# Patient Record
Sex: MALE | Race: BLACK OR AFRICAN AMERICAN | Employment: OTHER | URBAN - METROPOLITAN AREA
[De-identification: names, ages, dates, MRNs, and addresses within clinical notes are randomized per-mention and may not be internally consistent; named-entity substitution may affect disease eponyms.]

---

## 2021-04-01 ENCOUNTER — NURSING HOME VISIT (OUTPATIENT)
Dept: GERIATRICS | Facility: CLINIC | Age: 66
End: 2021-04-01
Payer: COMMERCIAL

## 2021-04-01 VITALS
BODY MASS INDEX: 34.65 KG/M2 | RESPIRATION RATE: 20 BRPM | SYSTOLIC BLOOD PRESSURE: 121 MMHG | HEART RATE: 78 BPM | OXYGEN SATURATION: 96 % | TEMPERATURE: 98 F | WEIGHT: 270 LBS | HEIGHT: 74 IN | DIASTOLIC BLOOD PRESSURE: 79 MMHG

## 2021-04-01 DIAGNOSIS — F33.3 SEVERE RECURRENT MAJOR DEPRESSIVE DISORDER WITH PSYCHOTIC FEATURES (H): ICD-10-CM

## 2021-04-01 DIAGNOSIS — B20 HUMAN IMMUNODEFICIENCY VIRUS (HIV) DISEASE (H): ICD-10-CM

## 2021-04-01 DIAGNOSIS — I10 ESSENTIAL HYPERTENSION: ICD-10-CM

## 2021-04-01 DIAGNOSIS — I50.42 CHRONIC COMBINED SYSTOLIC AND DIASTOLIC HEART FAILURE (H): ICD-10-CM

## 2021-04-01 DIAGNOSIS — F11.20 METHADONE DEPENDENCE (H): ICD-10-CM

## 2021-04-01 DIAGNOSIS — I48.20 CHRONIC ATRIAL FIBRILLATION (H): ICD-10-CM

## 2021-04-01 DIAGNOSIS — K21.9 GASTROESOPHAGEAL REFLUX DISEASE WITHOUT ESOPHAGITIS: ICD-10-CM

## 2021-04-01 DIAGNOSIS — R53.81 PHYSICAL DECONDITIONING: Primary | ICD-10-CM

## 2021-04-01 PROCEDURE — 99309 SBSQ NF CARE MODERATE MDM 30: CPT | Performed by: NURSE PRACTITIONER

## 2021-04-01 RX ORDER — MULTIVITAMIN
1 TABLET ORAL DAILY
COMMUNITY
Start: 2021-03-30 | End: 2021-04-02

## 2021-04-01 RX ORDER — NITROGLYCERIN 0.4 MG/1
0.4 TABLET SUBLINGUAL EVERY 5 MIN PRN
COMMUNITY
Start: 2021-03-30

## 2021-04-01 RX ORDER — IPRATROPIUM BROMIDE AND ALBUTEROL SULFATE 2.5; .5 MG/3ML; MG/3ML
3 SOLUTION RESPIRATORY (INHALATION) EVERY 4 HOURS PRN
COMMUNITY
Start: 2021-03-30

## 2021-04-01 RX ORDER — ALPRAZOLAM 2 MG
1 TABLET ORAL 2 TIMES DAILY PRN
COMMUNITY
End: 2021-04-06

## 2021-04-01 RX ORDER — TORSEMIDE 20 MG/1
40 TABLET ORAL DAILY
COMMUNITY
Start: 2021-03-30

## 2021-04-01 RX ORDER — NICOTINE 21 MG/24HR
1 PATCH, TRANSDERMAL 24 HOURS TRANSDERMAL DAILY
COMMUNITY
Start: 2021-03-30

## 2021-04-01 RX ORDER — POTASSIUM CHLORIDE 1500 MG/1
20 TABLET, EXTENDED RELEASE ORAL DAILY
COMMUNITY
Start: 2021-03-30

## 2021-04-01 RX ORDER — MOMETASONE FUROATE AND FORMOTEROL FUMARATE DIHYDRATE 100; 5 UG/1; UG/1
2 AEROSOL RESPIRATORY (INHALATION) 2 TIMES DAILY
COMMUNITY
Start: 2021-03-30

## 2021-04-01 RX ORDER — LACTULOSE 10 G/15ML
15 SOLUTION ORAL 2 TIMES DAILY PRN
COMMUNITY
Start: 2021-03-30

## 2021-04-01 RX ORDER — FLUTICASONE PROPIONATE 50 MCG
1 SPRAY, SUSPENSION (ML) NASAL DAILY
COMMUNITY

## 2021-04-01 RX ORDER — ACETAMINOPHEN 325 MG/1
650 TABLET ORAL EVERY 6 HOURS PRN
COMMUNITY
Start: 2020-05-04

## 2021-04-01 RX ORDER — DIPHENHYDRAMINE HCL 25 MG
25 CAPSULE ORAL EVERY 6 HOURS PRN
COMMUNITY
Start: 2021-03-30

## 2021-04-01 RX ORDER — METHADONE HYDROCHLORIDE 10 MG/ML
45 CONCENTRATE ORAL DAILY
COMMUNITY

## 2021-04-01 RX ORDER — DOLUTEGRAVIR SODIUM 50 MG/1
50 TABLET, FILM COATED ORAL DAILY
COMMUNITY
Start: 2021-03-30

## 2021-04-01 RX ORDER — DIGOXIN 125 MCG
125 TABLET ORAL DAILY
COMMUNITY

## 2021-04-01 RX ORDER — QUETIAPINE FUMARATE 100 MG/1
25 TABLET, FILM COATED ORAL AT BEDTIME
COMMUNITY

## 2021-04-01 RX ORDER — ALBUTEROL SULFATE 90 UG/1
2 AEROSOL, METERED RESPIRATORY (INHALATION) 4 TIMES DAILY
COMMUNITY

## 2021-04-01 RX ORDER — ESCITALOPRAM OXALATE 10 MG/1
10 TABLET ORAL EVERY MORNING
COMMUNITY
Start: 2021-03-30

## 2021-04-01 RX ORDER — AMLODIPINE BESYLATE 5 MG/1
5 TABLET ORAL DAILY
COMMUNITY
Start: 2021-03-30

## 2021-04-01 RX ORDER — DULOXETIN HYDROCHLORIDE 30 MG/1
30 CAPSULE, DELAYED RELEASE ORAL DAILY
COMMUNITY
Start: 2021-03-30

## 2021-04-01 RX ORDER — EMTRICITABINE AND TENOFOVIR ALAFENAMIDE 200; 25 MG/1; MG/1
1 TABLET ORAL DAILY
COMMUNITY
Start: 2021-03-30 | End: 2021-04-02

## 2021-04-01 RX ORDER — GABAPENTIN 600 MG/1
600 TABLET ORAL 3 TIMES DAILY
COMMUNITY
Start: 2021-03-30

## 2021-04-01 RX ORDER — METOPROLOL SUCCINATE 200 MG/1
200 TABLET, EXTENDED RELEASE ORAL DAILY
COMMUNITY
Start: 2021-03-30

## 2021-04-01 ASSESSMENT — MIFFLIN-ST. JEOR: SCORE: 2074.71

## 2021-04-01 NOTE — PROGRESS NOTES
[place] [facility]  PRIMARY CARE PROVIDER AND CLINIC:  [facility] [place], [address] [place] [id number]  Chief Complaint   Patient presents with     [facility] F/U     [place] Medical Record Number:  [id number]  Place of Service where encounter took place:  [facility] (TCU) [[id number]]    Juan Pena  is a 65 year old  (1955), admitted to the above facility from  [facility] . Hospital stay 3/25/21 through 3/31/21..  Admitted to this facility for  rehab, medical management and nursing care.    HPI:    HPI information obtained from: facility chart records, facility staff, patient report and Care Everywhere Epic chart review.   Brief Summary of Hospital Course: PMH HIV (followed by Rina COURTNEY), afib on Xarelto, HTN, CHF, cardiomyopathy, methadone dependence, and GERD. He presented to the ED with slurred speech, decreased LOC, and facial droop. This was ultimately determined to be cerebral hypoperfusion or sedating medication induced. Ammonia level noted to be elevated and he was started on lactulose. He later admitted to drinking prior to admission due to birth of grandson. EEG negative. He was recently admitted to [facility] 3/22-3/24 for acute CHF. Demadex was added that stay.    Updates on Status Since Skilled nursing Admission:   Nursing reports that patient is constantly drowsy, often slow to respond to questions. He continues to ask for his xanax. He answers some questions but then spends several minutes trying to remember something he needed. He eventually gets out that he needs more clothes. The nurse manager advises that they will ask family to bring some. He says he has no other concerns    CODE STATUS/ADVANCE DIRECTIVES DISCUSSION:   CPR/Full code   Patient's living condition: lives with spouse  ALLERGIES: Demeclocycline and Tetracyclines  PAST MEDICAL HISTORY:  has a past medical history of Anxiety, Depressive disorder, Diabetes mellitus  (H), Hepatitis C, HIV (human immunodeficiency virus infection) (H), Hypertension, and Myocardial infarction.  PAST SURGICAL HISTORY:   has a past surgical history that includes orthopedic surgery.  FAMILY HISTORY: family history is not on file.  SOCIAL HISTORY:       Post Discharge Medication Reconciliation Status: discharge medications reconciled, continue medications without change    Current Outpatient Medications   Medication Sig Dispense Refill     acetaminophen (TYLENOL) 325 MG tablet Take 650 mg by mouth every 6 hours as needed       albuterol (PROAIR HFA/PROVENTIL HFA/VENTOLIN HFA) 108 (90 Base) MCG/ACT inhaler Inhale 2 puffs into the lungs 4 times daily       ALPRAZolam (XANAX) 2 MG tablet Take 1 mg by mouth 2 times daily as needed       amLODIPine (NORVASC) 5 MG tablet Take 5 mg by mouth daily       digoxin (LANOXIN) 125 MCG tablet Take 125 mcg by mouth daily       diphenhydrAMINE (BENADRYL) 25 MG capsule Take 25 mg by mouth every 6 hours as needed       dolutegravir (TIVICAY) 50 MG tablet Take 50 mg by mouth daily Special Instructions: Must be administered 2 hours before or at least 6 hours after multivitamin.       DULoxetine (CYMBALTA) 30 MG capsule Take 30 mg by mouth daily       emtricitabine-tenofovir (TRUVADA) 200-300 MG per tablet Take 1 tablet by mouth daily.       escitalopram (LEXAPRO) 10 MG tablet Take 10 mg by mouth every morning       fluticasone (FLONASE) 50 MCG/ACT nasal spray Spray 1 spray into both nostrils daily       gabapentin (NEURONTIN) 600 MG tablet Take 600 mg by mouth 3 times daily       ipratropium - albuterol 0.5 mg/2.5 mg/3 mL (DUONEB) 0.5-2.5 (3) MG/3ML neb solution Inhale 3 mLs into the lungs every 4 hours as needed       lactulose (CHRONULAC) 10 GM/15ML solution Take 15 mLs by mouth 2 times daily as needed       lisinopril (PRINIVIL,ZESTRIL) 20 MG tablet Take 1 tablet by mouth daily.       melatonin 5 MG tablet Take 10 mg by mouth nightly as needed       methadone  "(DOLOPHINE-INTENSOL) 10 MG/ML (HIGH CONC) solution Take 45 mg by mouth daily Resident will go to Methadone clinic for Monday through Saturday doses. Facility will administer Sunday dose       metoprolol succinate ER (TOPROL-XL) 200 MG 24 hr tablet Take 200 mg by mouth daily       mometasone-formoterol (DULERA) 100-5 MCG/ACT inhaler Inhale 2 puffs into the lungs 2 times daily       Multiple Vitamins-Minerals (MULTIVITAL) TABS Take 1 tablet by mouth daily.       nicotine (NICODERM CQ) 21 MG/24HR 24 hr patch Place 1 patch onto the skin daily       nicotine (NICOTROL) 10 MG inhaler Inhale 10 mg into the lungs every 4 hours as needed       nitroGLYcerin (NITROSTAT) 0.4 MG sublingual tablet Place 0.4 mg under the tongue every 5 minutes as needed       potassium chloride ER (KLOR-CON M) 20 MEQ CR tablet Take 20 mEq by mouth daily       QUEtiapine (SEROQUEL) 100 MG tablet Take 25 mg by mouth At Bedtime        rivaroxaban ANTICOAGULANT (XARELTO) 20 MG TABS tablet Take 20 mg by mouth daily (with dinner)       torsemide (DEMADEX) 20 MG tablet Take 40 mg by mouth daily         ROS:  Limited secondary to drowsiness but today pt reports 4 point ROS including Respiratory, CV, GI and , other than that noted in the HPI,  is negative    Vitals:  /79   Pulse 78   Temp 98  F (36.7  C)   Resp 20   Ht 1.88 m (6' 2.02\")   Wt 122.5 kg (270 lb)   SpO2 96%   BMI 34.65 kg/m    Exam:  GENERAL APPEARANCE:  Drowsy, briefly opens eyes, then closes them again, clothes wet from spilled water  ENT:  Mouth and posterior oropharynx normal, moist mucous membranes, normal hearing acuity  EYES:  Conjunctiva and lids normal  RESP:  respiratory effort and palpation of chest normal, lungs clear to auscultation , no respiratory distress  CV:  Palpation and auscultation of heart done , regular rate and rhythm, no murmur, rub, or gallop, peripheral edema 1+ in ankles  ABDOMEN:  bowel sounds normal, soft, non-tender  SKIN:  Inspection of skin and " subcutaneous tissue baseline, Palpation of skin and subcutaneous tissue baseline  PSYCH:  insight and judgement impaired, challenging to get much conversation from him    Lab/Diagnostic data:  Recent labs in EPIC reviewed by me today.       ASSESSMENT/PLAN:  (R53.81) Physical deconditioning  (primary encounter diagnosis)  Comment: Due to hospital stays, illness. Plan is to discharge home when therapy goals met  Plan: PT/OT eval and treat, discharge planning per their recommendations.    (I10) Essential hypertension  Comment: Chronic, controlled  Plan: Continue current POC with no changes at this time and adjustments as needed.    (F33.3) Severe recurrent major depressive disorder with psychotic features (H)  Comment: Patient is quite drowsy. He does not appear encephalopathic, just sedated. He has not had access to any alcohol, so this is likely related to his medications.   Plan: Continue current POC with no changes at this time and adjustments as needed.    (I48.20) Chronic atrial fibrillation (H)  Comment: HR controlled. No s/sx side effects with DOAC  Plan: Continue current POC with no changes at this time and adjustments as needed.    (F11.20) Methadone dependence (H)  Comment: Typically goes to methadone clinic daily, this can be administered here.  Plan: Continue current POC with no changes at this time and adjustments as needed.    (I50.42) Chronic combined systolic and diastolic heart failure (H)  Comment: Mild edema at ankles, otherwise no evidence of fluid overload.   Plan: Continue current torsemide. Monitor for shortness of breath, wheezing, increasing lower extremity edema and change in activity tolerance.     (K21.9) Gastroesophageal reflux disease without esophagitis  Comment: No obvious symptoms  Plan: Continue current POC with no changes at this time and adjustments as needed.    (B20) Human immunodeficiency virus (HIV) disease (H)  Comment: Chronic, managed by ID. No acute  concerns      Electronically signed by:  PERLA Peacock [facility]  Phone: [phone]

## 2021-04-01 NOTE — LETTER
4/1/2021        RE: Juan Pena  [address]  [facility] [address]        [place] [facility]  PRIMARY CARE PROVIDER AND CLINIC:  [facility], [address] [facility] [address]  Chief Complaint   Patient presents with     [facility] F/U     [place] Medical Record Number:  [id number]  Place of Service where encounter took place:  [facility] (TCU) [[id number]]    Juan Pena  is a 65 year old  (1955), admitted to the above facility from  [facility] . Hospital stay 3/25/21 through 3/31/21..  Admitted to this facility for  rehab, medical management and nursing care.    HPI:    HPI information obtained from: facility chart records, facility staff, patient report and Care Everywhere Epic chart review.   Brief Summary of Hospital Course: PMH HIV (followed by Rina COURTNEY), afib on Xarelto, HTN, CHF, cardiomyopathy, methadone dependence, and GERD. He presented to the ED with slurred speech, decreased LOC, and facial droop. This was ultimately determined to be cerebral hypoperfusion or sedating medication induced. Ammonia level noted to be elevated and he was started on lactulose. He later admitted to drinking prior to admission due to birth of grandson. EEG negative. He was recently admitted to [facility] 3/22-3/24 for acute CHF. Demadex was added that stay.    Updates on Status Since Skilled nursing Admission:   Nursing reports that patient is constantly drowsy, often slow to respond to questions. He continues to ask for his xanax. He answers some questions but then spends several minutes trying to remember something he needed. He eventually gets out that he needs more clothes. The nurse manager advises that they will ask family to bring some. He says he has no other concerns    CODE STATUS/ADVANCE DIRECTIVES DISCUSSION:   CPR/Full code   Patient's living condition: lives with spouse  ALLERGIES: Demeclocycline and Tetracyclines  PAST  MEDICAL HISTORY:  has a past medical history of Anxiety, Depressive disorder, Diabetes mellitus (H), Hepatitis C, HIV (human immunodeficiency virus infection) (H), Hypertension, and Myocardial infarction.  PAST SURGICAL HISTORY:   has a past surgical history that includes orthopedic surgery.  FAMILY HISTORY: family history is not on file.  SOCIAL HISTORY:       Post Discharge Medication Reconciliation Status: discharge medications reconciled, continue medications without change    Current Outpatient Medications   Medication Sig Dispense Refill     acetaminophen (TYLENOL) 325 MG tablet Take 650 mg by mouth every 6 hours as needed       albuterol (PROAIR HFA/PROVENTIL HFA/VENTOLIN HFA) 108 (90 Base) MCG/ACT inhaler Inhale 2 puffs into the lungs 4 times daily       ALPRAZolam (XANAX) 2 MG tablet Take 1 mg by mouth 2 times daily as needed       amLODIPine (NORVASC) 5 MG tablet Take 5 mg by mouth daily       digoxin (LANOXIN) 125 MCG tablet Take 125 mcg by mouth daily       diphenhydrAMINE (BENADRYL) 25 MG capsule Take 25 mg by mouth every 6 hours as needed       dolutegravir (TIVICAY) 50 MG tablet Take 50 mg by mouth daily Special Instructions: Must be administered 2 hours before or at least 6 hours after multivitamin.       DULoxetine (CYMBALTA) 30 MG capsule Take 30 mg by mouth daily       emtricitabine-tenofovir (TRUVADA) 200-300 MG per tablet Take 1 tablet by mouth daily.       escitalopram (LEXAPRO) 10 MG tablet Take 10 mg by mouth every morning       fluticasone (FLONASE) 50 MCG/ACT nasal spray Spray 1 spray into both nostrils daily       gabapentin (NEURONTIN) 600 MG tablet Take 600 mg by mouth 3 times daily       ipratropium - albuterol 0.5 mg/2.5 mg/3 mL (DUONEB) 0.5-2.5 (3) MG/3ML neb solution Inhale 3 mLs into the lungs every 4 hours as needed       lactulose (CHRONULAC) 10 GM/15ML solution Take 15 mLs by mouth 2 times daily as needed       lisinopril (PRINIVIL,ZESTRIL) 20 MG tablet Take 1 tablet by mouth  "daily.       melatonin 5 MG tablet Take 10 mg by mouth nightly as needed       methadone (DOLOPHINE-INTENSOL) 10 MG/ML (HIGH CONC) solution Take 45 mg by mouth daily Resident will go to Methadone clinic for Monday through Saturday doses. Facility will administer Sunday dose       metoprolol succinate ER (TOPROL-XL) 200 MG 24 hr tablet Take 200 mg by mouth daily       mometasone-formoterol (DULERA) 100-5 MCG/ACT inhaler Inhale 2 puffs into the lungs 2 times daily       Multiple Vitamins-Minerals (MULTIVITAL) TABS Take 1 tablet by mouth daily.       nicotine (NICODERM CQ) 21 MG/24HR 24 hr patch Place 1 patch onto the skin daily       nicotine (NICOTROL) 10 MG inhaler Inhale 10 mg into the lungs every 4 hours as needed       nitroGLYcerin (NITROSTAT) 0.4 MG sublingual tablet Place 0.4 mg under the tongue every 5 minutes as needed       potassium chloride ER (KLOR-CON M) 20 MEQ CR tablet Take 20 mEq by mouth daily       QUEtiapine (SEROQUEL) 100 MG tablet Take 25 mg by mouth At Bedtime        rivaroxaban ANTICOAGULANT (XARELTO) 20 MG TABS tablet Take 20 mg by mouth daily (with dinner)       torsemide (DEMADEX) 20 MG tablet Take 40 mg by mouth daily         ROS:  Limited secondary to drowsiness but today pt reports 4 point ROS including Respiratory, CV, GI and , other than that noted in the HPI,  is negative    Vitals:  /79   Pulse 78   Temp 98  F (36.7  C)   Resp 20   Ht 1.88 m (6' 2.02\")   Wt 122.5 kg (270 lb)   SpO2 96%   BMI 34.65 kg/m    Exam:  GENERAL APPEARANCE:  Drowsy, briefly opens eyes, then closes them again, clothes wet from spilled water  ENT:  Mouth and posterior oropharynx normal, moist mucous membranes, normal hearing acuity  EYES:  Conjunctiva and lids normal  RESP:  respiratory effort and palpation of chest normal, lungs clear to auscultation , no respiratory distress  CV:  Palpation and auscultation of heart done , regular rate and rhythm, no murmur, rub, or gallop, peripheral edema 1+ " in ankles  ABDOMEN:  bowel sounds normal, soft, non-tender  SKIN:  Inspection of skin and subcutaneous tissue baseline, Palpation of skin and subcutaneous tissue baseline  PSYCH:  insight and judgement impaired, challenging to get much conversation from him    Lab/Diagnostic data:  Recent labs in [facility] reviewed by me today.       ASSESSMENT/PLAN:  (R53.81) Physical deconditioning  (primary encounter diagnosis)  Comment: Due to hospital stays, illness. Plan is to discharge home when therapy goals met  Plan: PT/OT eval and treat, discharge planning per their recommendations.    (I10) Essential hypertension  Comment: Chronic, controlled  Plan: Continue current POC with no changes at this time and adjustments as needed.    (F33.3) Severe recurrent major depressive disorder with psychotic features (H)  Comment: Patient is quite drowsy. He does not appear encephalopathic, just sedated. He has not had access to any alcohol, so this is likely related to his medications.   Plan: Continue current POC with no changes at this time and adjustments as needed.    (I48.20) Chronic atrial fibrillation (H)  Comment: HR controlled. No s/sx side effects with DOAC  Plan: Continue current POC with no changes at this time and adjustments as needed.    (F11.20) Methadone dependence (H)  Comment: Typically goes to methadone clinic daily, this can be administered here.  Plan: Continue current POC with no changes at this time and adjustments as needed.    (I50.42) Chronic combined systolic and diastolic heart failure (H)  Comment: Mild edema at ankles, otherwise no evidence of fluid overload.   Plan: Continue current torsemide. Monitor for shortness of breath, wheezing, increasing lower extremity edema and change in activity tolerance.     (K21.9) Gastroesophageal reflux disease without esophagitis  Comment: No obvious symptoms  Plan: Continue current POC with no changes at this time and adjustments as needed.    (B20) Human immunodeficiency  virus (HIV) disease (H)  Comment: Chronic, managed by ID. No acute concerns      Electronically signed by:  PERLA Peacock [facility]  Phone: [phone]

## 2021-04-06 DIAGNOSIS — F41.9 ANXIETY: Primary | ICD-10-CM

## 2021-04-06 RX ORDER — ALPRAZOLAM 2 MG
TABLET ORAL
Qty: 30 TABLET | Refills: 1 | Status: SHIPPED | OUTPATIENT
Start: 2021-04-06

## 2021-04-07 ENCOUNTER — NURSING HOME VISIT (OUTPATIENT)
Dept: GERIATRICS | Facility: CLINIC | Age: 66
End: 2021-04-07
Payer: COMMERCIAL

## 2021-04-07 VITALS
BODY MASS INDEX: 34.64 KG/M2 | WEIGHT: 269.9 LBS | SYSTOLIC BLOOD PRESSURE: 120 MMHG | HEART RATE: 47 BPM | OXYGEN SATURATION: 96 % | HEIGHT: 74 IN | DIASTOLIC BLOOD PRESSURE: 79 MMHG | RESPIRATION RATE: 18 BRPM | TEMPERATURE: 98.3 F

## 2021-04-07 DIAGNOSIS — I50.42 CHRONIC COMBINED SYSTOLIC AND DIASTOLIC HEART FAILURE (H): ICD-10-CM

## 2021-04-07 DIAGNOSIS — R40.0 SOMNOLENCE: ICD-10-CM

## 2021-04-07 DIAGNOSIS — I48.20 CHRONIC ATRIAL FIBRILLATION (H): ICD-10-CM

## 2021-04-07 DIAGNOSIS — F19.20 DRUG DEPENDENCE (H): Primary | ICD-10-CM

## 2021-04-07 DIAGNOSIS — I10 ESSENTIAL HYPERTENSION: ICD-10-CM

## 2021-04-07 DIAGNOSIS — B20 HUMAN IMMUNODEFICIENCY VIRUS (HIV) DISEASE (H): ICD-10-CM

## 2021-04-07 DIAGNOSIS — F43.21 ADJUSTMENT DISORDER WITH DEPRESSED MOOD: ICD-10-CM

## 2021-04-07 PROCEDURE — 99309 SBSQ NF CARE MODERATE MDM 30: CPT | Performed by: NURSE PRACTITIONER

## 2021-04-07 ASSESSMENT — MIFFLIN-ST. JEOR: SCORE: 2074.01

## 2021-04-07 NOTE — LETTER
4/7/2021        RE: Juan Pena  [address]  [facility] [address]        [place] [facility]  [place] Medical Record Number:  [id number]  Place of Service where encounter took place:  [facility] (U) [[id number]]  Chief Complaint   Patient presents with     RECHECK       HPI:    Juan Pena  is a 65 year old (1955), who is being seen today for an episodic care visit.  HPI information obtained from: facility chart records, facility staff, patient report and [facility] chart review. Today's concern is:  Patient has been in TCU since 3/31 following hospitalization  due to slurred speech, decreased LEVEL OF CONSCIOUSNESS and facial droop. PMH includes HIV (followed by Rina COURTNEY), afib on Xarelto, HTN, CHF, cardiomyopathy, methadone dependence, and GERD. Work up in hospital suggested that symptoms due to cerebral hypoperfusion or sedating medications. He was started on lactulose due to elevated ammonia level.   Since in TCU he does respond to questions but he falls asleep mid sentence. He is not eating much.   He denies pain and he denies shortness of breath.     Past Medical and Surgical History reviewed in Epic today.    MEDICATIONS:    Current Outpatient Medications   Medication Sig Dispense Refill     acetaminophen (TYLENOL) 325 MG tablet Take 650 mg by mouth every 6 hours as needed       albuterol (PROAIR HFA/PROVENTIL HFA/VENTOLIN HFA) 108 (90 Base) MCG/ACT inhaler Inhale 2 puffs into the lungs 4 times daily       ALPRAZolam (XANAX) 2 MG tablet Take 1/2 tab (1mg) BID PRN 30 tablet 1     amLODIPine (NORVASC) 5 MG tablet Take 5 mg by mouth daily       digoxin (LANOXIN) 125 MCG tablet Take 125 mcg by mouth daily       diphenhydrAMINE (BENADRYL) 25 MG capsule Take 25 mg by mouth every 6 hours as needed       dolutegravir (TIVICAY) 50 MG tablet Take 50 mg by mouth daily Special Instructions: Must be administered 2 hours before or at least 6 hours after  multivitamin.       DULoxetine (CYMBALTA) 30 MG capsule Take 30 mg by mouth daily       emtricitabine-tenofovir (TRUVADA) 200-300 MG per tablet Take 1 tablet by mouth daily.       escitalopram (LEXAPRO) 10 MG tablet Take 10 mg by mouth every morning       fluticasone (FLONASE) 50 MCG/ACT nasal spray Spray 1 spray into both nostrils daily       gabapentin (NEURONTIN) 600 MG tablet Take 600 mg by mouth 3 times daily       ipratropium - albuterol 0.5 mg/2.5 mg/3 mL (DUONEB) 0.5-2.5 (3) MG/3ML neb solution Inhale 3 mLs into the lungs every 4 hours as needed       lactulose (CHRONULAC) 10 GM/15ML solution Take 15 mLs by mouth 2 times daily as needed       lisinopril (PRINIVIL,ZESTRIL) 20 MG tablet Take 1 tablet by mouth daily.       melatonin 5 MG tablet Take 10 mg by mouth nightly as needed       methadone (DOLOPHINE-INTENSOL) 10 MG/ML (HIGH CONC) solution Take 45 mg by mouth daily Resident will go to Methadone clinic for Monday through Saturday doses. Facility will administer Sunday dose       metoprolol succinate ER (TOPROL-XL) 200 MG 24 hr tablet Take 200 mg by mouth daily       mometasone-formoterol (DULERA) 100-5 MCG/ACT inhaler Inhale 2 puffs into the lungs 2 times daily       Multiple Vitamins-Minerals (MULTIVITAL) TABS Take 1 tablet by mouth daily.       nicotine (NICODERM CQ) 21 MG/24HR 24 hr patch Place 1 patch onto the skin daily       nicotine (NICOTROL) 10 MG inhaler Inhale 10 mg into the lungs every 4 hours as needed       nitroGLYcerin (NITROSTAT) 0.4 MG sublingual tablet Place 0.4 mg under the tongue every 5 minutes as needed       potassium chloride ER (KLOR-CON M) 20 MEQ CR tablet Take 20 mEq by mouth daily       QUEtiapine (SEROQUEL) 100 MG tablet Take 25 mg by mouth At Bedtime        rivaroxaban ANTICOAGULANT (XARELTO) 20 MG TABS tablet Take 20 mg by mouth daily (with dinner)       torsemide (DEMADEX) 20 MG tablet Take 40 mg by mouth daily           REVIEW OF SYSTEMS:  Limited secondary to cognitive  "impairment but today pt reports no pain    Objective:  /79   Pulse (!) 47   Temp 98.3  F (36.8  C)   Resp 18   Ht 1.88 m (6' 2\")   Wt 122.4 kg (269 lb 14.4 oz)   SpO2 96%   BMI 34.65 kg/m    Exam:  GENERAL APPEARANCE:  Alert, in no distress  ENT:  Mouth and posterior oropharynx normal, moist mucous membranes, hearing acuity adequate   EYES:  EOM, conjunctivae, lids, pupils and irises normal  RESP:  respiratory effort  normal, no respiratory distress,   CV:   Edema none  ABDOMEN:  normal bowel sounds, soft, nontender,   M/S:   Gait and station generalized weakness, Digits and nails normal   SKIN:  Inspection/Palpation of skin and subcutaneous tissue no rash  NEURO: 2-12 in normal limits and at patient's baseline  PSYCH:  insight and judgement, memory impaired , affect and mood flat    Labs:   Labs reviewed    ASSESSMENT/PLAN:  Somnolence  Adjustment disorder with depressed mood  Drug dependence (H)  He is on multiple medications that could be contributing to somnolence: alprazolam, diphenhydramine, methadone and quetiapine.   He is followed by methadone clinic  -follow up with methadone clinic  -consider ACP referral     Human immunodeficiency virus (HIV) disease (H)  Chronic meds managed by ID clinic    Chronic atrial fibrillation (H)  CHF  Recent hospitalization for HF. Started on torsemide. No shortness of breath or edema today. EF 50%  Anticoagulated with xarelto  -daily wts  -continue digoxin, metoprolol    Essential hypertension  BPs reviewed in [facility] record-controlled  -no change in medications        Electronically signed by:  PERLA Pacheco CNP                 Sincerely,        PERLA Pacheco CNP      "

## 2021-04-07 NOTE — PROGRESS NOTES
[facility]  [facility] Record Number:  [id number]  Place of Service where encounter took place:  [facility] [place] ([place]) [[id number]]  Chief Complaint   Patient presents with     RECHECK       HPI:    Juan Pena  is a 65 year old (1955), who is being seen today for an episodic care visit.  HPI information obtained from: facility chart records, facility staff, patient report and [facility] chart review. Today's concern is:  Patient has been in TCU since 3/31 following hospitalization  due to slurred speech, decreased LEVEL OF CONSCIOUSNESS and facial droop. PMH includes HIV (followed by R[facility] COURTNEY), afib on Xarelto, HTN, CHF, cardiomyopathy, methadone dependence, and GERD. Work up in hospital suggested that symptoms due to cerebral hypoperfusion or sedating medications. He was started on lactulose due to elevated ammonia level.   Since in TCU he does respond to questions but he falls asleep mid sentence. He is not eating much.   He denies pain and he denies shortness of breath.     Past Medical and Surgical History reviewed in Epic today.    MEDICATIONS:    Current Outpatient Medications   Medication Sig Dispense Refill     acetaminophen (TYLENOL) 325 MG tablet Take 650 mg by mouth every 6 hours as needed       albuterol (PROAIR HFA/PROVENTIL HFA/VENTOLIN HFA) 108 (90 Base) MCG/ACT inhaler Inhale 2 puffs into the lungs 4 times daily       ALPRAZolam (XANAX) 2 MG tablet Take 1/2 tab (1mg) BID PRN 30 tablet 1     amLODIPine (NORVASC) 5 MG tablet Take 5 mg by mouth daily       digoxin (LANOXIN) 125 MCG tablet Take 125 mcg by mouth daily       diphenhydrAMINE (BENADRYL) 25 MG capsule Take 25 mg by mouth every 6 hours as needed       dolutegravir (TIVICAY) 50 MG tablet Take 50 mg by mouth daily Special Instructions: Must be administered 2 hours before or at least 6 hours after multivitamin.       DULoxetine (CYMBALTA) 30 MG capsule Take 30 mg by mouth daily        emtricitabine-tenofovir (TRUVADA) 200-300 MG per tablet Take 1 tablet by mouth daily.       escitalopram (LEXAPRO) 10 MG tablet Take 10 mg by mouth every morning       fluticasone (FLONASE) 50 MCG/ACT nasal spray Spray 1 spray into both nostrils daily       gabapentin (NEURONTIN) 600 MG tablet Take 600 mg by mouth 3 times daily       ipratropium - albuterol 0.5 mg/2.5 mg/3 mL (DUONEB) 0.5-2.5 (3) MG/3ML neb solution Inhale 3 mLs into the lungs every 4 hours as needed       lactulose (CHRONULAC) 10 GM/15ML solution Take 15 mLs by mouth 2 times daily as needed       lisinopril (PRINIVIL,ZESTRIL) 20 MG tablet Take 1 tablet by mouth daily.       melatonin 5 MG tablet Take 10 mg by mouth nightly as needed       methadone (DOLOPHINE-INTENSOL) 10 MG/ML (HIGH CONC) solution Take 45 mg by mouth daily Resident will go to Methadone clinic for Monday through Saturday doses. Facility will administer Sunday dose       metoprolol succinate ER (TOPROL-XL) 200 MG 24 hr tablet Take 200 mg by mouth daily       mometasone-formoterol (DULERA) 100-5 MCG/ACT inhaler Inhale 2 puffs into the lungs 2 times daily       Multiple Vitamins-Minerals (MULTIVITAL) TABS Take 1 tablet by mouth daily.       nicotine (NICODERM CQ) 21 MG/24HR 24 hr patch Place 1 patch onto the skin daily       nicotine (NICOTROL) 10 MG inhaler Inhale 10 mg into the lungs every 4 hours as needed       nitroGLYcerin (NITROSTAT) 0.4 MG sublingual tablet Place 0.4 mg under the tongue every 5 minutes as needed       potassium chloride ER (KLOR-CON M) 20 MEQ CR tablet Take 20 mEq by mouth daily       QUEtiapine (SEROQUEL) 100 MG tablet Take 25 mg by mouth At Bedtime        rivaroxaban ANTICOAGULANT (XARELTO) 20 MG TABS tablet Take 20 mg by mouth daily (with dinner)       torsemide (DEMADEX) 20 MG tablet Take 40 mg by mouth daily           REVIEW OF SYSTEMS:  Limited secondary to cognitive impairment but today pt reports no pain    Objective:  /79   Pulse (!) 47   Temp  "98.3  F (36.8  C)   Resp 18   Ht 1.88 m (6' 2\")   Wt 122.4 kg (269 lb 14.4 oz)   SpO2 96%   BMI 34.65 kg/m    Exam:  GENERAL APPEARANCE:  Alert, in no distress  ENT:  Mouth and posterior oropharynx normal, moist mucous membranes, hearing acuity adequate   EYES:  EOM, conjunctivae, lids, pupils and irises normal  RESP:  respiratory effort  normal, no respiratory distress,   CV:   Edema none  ABDOMEN:  normal bowel sounds, soft, nontender,   M/S:   Gait and station generalized weakness, Digits and nails normal   SKIN:  Inspection/Palpation of skin and subcutaneous tissue no rash  NEURO: 2-12 in normal limits and at patient's baseline  PSYCH:  insight and judgement, memory impaired , affect and mood flat    Labs:   Labs reviewed    ASSESSMENT/PLAN:  Somnolence  Adjustment disorder with depressed mood  Drug dependence (H)  He is on multiple medications that could be contributing to somnolence: alprazolam, diphenhydramine, methadone and quetiapine.   He is followed by methadone clinic  -follow up with methadone clinic  -consider ACP referral     Human immunodeficiency virus (HIV) disease (H)  Chronic meds managed by ID clinic    Chronic atrial fibrillation (H)  CHF  Recent hospitalization for HF. Started on torsemide. No shortness of breath or edema today. EF 50%  Anticoagulated with xarelto  -daily wts  -continue digoxin, metoprolol    Essential hypertension  BPs reviewed in [facility] record-controlled  -no change in medications        Electronically signed by:  PERLA Pacheco CNP           "

## 2021-04-29 ENCOUNTER — RECORDS - HEALTHEAST (OUTPATIENT)
Dept: LAB | Facility: CLINIC | Age: 66
End: 2021-04-29

## 2021-05-03 LAB — 25(OH)D3 SERPL-MCNC: 22 NG/ML (ref 30–80)

## 2021-05-05 ENCOUNTER — RECORDS - HEALTHEAST (OUTPATIENT)
Dept: LAB | Facility: CLINIC | Age: 66
End: 2021-05-05

## 2021-05-06 LAB — DIGOXIN LEVEL LHE- HISTORICAL: 0.6 NG/ML (ref 0.5–2)
